# Patient Record
Sex: MALE | Race: WHITE | Employment: FULL TIME | URBAN - METROPOLITAN AREA
[De-identification: names, ages, dates, MRNs, and addresses within clinical notes are randomized per-mention and may not be internally consistent; named-entity substitution may affect disease eponyms.]

---

## 2018-08-15 ENCOUNTER — OFFICE VISIT (OUTPATIENT)
Dept: URGENT CARE | Age: 33
End: 2018-08-15

## 2018-08-15 VITALS
WEIGHT: 200 LBS | SYSTOLIC BLOOD PRESSURE: 118 MMHG | BODY MASS INDEX: 28 KG/M2 | DIASTOLIC BLOOD PRESSURE: 80 MMHG | HEART RATE: 108 BPM | HEIGHT: 71 IN | TEMPERATURE: 100.9 F | OXYGEN SATURATION: 97 % | RESPIRATION RATE: 16 BRPM

## 2018-08-15 DIAGNOSIS — R07.89 CHEST TIGHTNESS: Primary | ICD-10-CM

## 2018-08-15 DIAGNOSIS — R19.7 VOMITING AND DIARRHEA: ICD-10-CM

## 2018-08-15 DIAGNOSIS — R50.9 FEVER, UNSPECIFIED FEVER CAUSE: ICD-10-CM

## 2018-08-15 DIAGNOSIS — R11.10 VOMITING AND DIARRHEA: ICD-10-CM

## 2018-08-15 LAB
BILIRUBIN, POC: ABNORMAL
BLOOD URINE, POC: ABNORMAL
CLARITY, POC: CLEAR
COLOR, POC: ABNORMAL
GLUCOSE URINE, POC: ABNORMAL
KETONES, POC: ABNORMAL
LEUKOCYTE EST, POC: ABNORMAL
NITRITE, POC: ABNORMAL
PH, POC: 6
PROTEIN, POC: ABNORMAL
SPECIFIC GRAVITY, POC: 1.02
UROBILINOGEN, POC: ABNORMAL

## 2018-08-15 PROCEDURE — 99203 OFFICE O/P NEW LOW 30 MIN: CPT | Performed by: EMERGENCY MEDICINE

## 2018-08-15 PROCEDURE — G8427 DOCREV CUR MEDS BY ELIG CLIN: HCPCS | Performed by: EMERGENCY MEDICINE

## 2018-08-15 PROCEDURE — G8419 CALC BMI OUT NRM PARAM NOF/U: HCPCS | Performed by: EMERGENCY MEDICINE

## 2018-08-15 PROCEDURE — 81002 URINALYSIS NONAUTO W/O SCOPE: CPT | Performed by: EMERGENCY MEDICINE

## 2018-08-15 PROCEDURE — 93000 ELECTROCARDIOGRAM COMPLETE: CPT | Performed by: EMERGENCY MEDICINE

## 2018-08-15 PROCEDURE — 1036F TOBACCO NON-USER: CPT | Performed by: EMERGENCY MEDICINE

## 2018-08-15 RX ORDER — ASPIRIN 81 MG/1
162 TABLET, CHEWABLE ORAL ONCE
Qty: 2 TABLET | Refills: 0
Start: 2018-08-15 | End: 2020-09-15

## 2018-08-15 RX ORDER — ACETAMINOPHEN 500 MG
1000 TABLET ORAL ONCE
Qty: 2 TABLET | Refills: 0
Start: 2018-08-15 | End: 2020-09-15 | Stop reason: ALTCHOICE

## 2018-08-15 ASSESSMENT — ENCOUNTER SYMPTOMS
EYE PAIN: 0
NAUSEA: 1
VOMITING: 1
ABDOMINAL PAIN: 0
COUGH: 1
TROUBLE SWALLOWING: 0
SHORTNESS OF BREATH: 0
SORE THROAT: 0
RHINORRHEA: 0
DIARRHEA: 1
WHEEZING: 0
EYE DISCHARGE: 0

## 2018-08-15 NOTE — PROGRESS NOTES
has a normal mood and affect. His behavior is normal.   Vitals reviewed. Procedure:     EKG: NSR, rate 93, nl axis, non-specific lateral ST segment changes and precordial j-point elevation, suspect repolarization vairant. Chest X-Ray    Interpreted by: me    View: PA/Lateral chest xray    Findings: Normal heart size, Normal lungs, Normal mediastinum, No infiltrates, No pneumothorax, No effusion    Results for POC orders placed in visit on 08/15/18   POCT Urinalysis no Micro   Result Value Ref Range    Color, UA reddish     Clarity, UA clear     Glucose, UA POC neg     Bilirubin, UA neg     Ketones, UA neg     Spec Grav, UA 1.025     Blood, UA POC Trace     pH, UA 6.0     Protein, UA POC +     Urobilinogen, UA neg     Leukocytes, UA neg     Nitrite, UA neg        Tylenol 1000mg and Aspirin 162mg given po      Assessment:    1. Chest tightness    2. Fever, unspecified fever cause    3. Vomiting and diarrhea        Patient was advised that his medical problem was beyond the scope of what I could address in an urgent care setting. Risks and benefits of seeking further treatment were discussed with the patient. Patient verbalized his understanding of the risks and benefits to myself. Patient was advised to go directly to the emergency department for further evaluation. EMS tranportation to the emergency department was declined by the patient. Patient appears stable to travel via private vehicle and has selected [facility]. ED staff notified by phone.          Plan:    Orders Placed This Encounter   Medications    acetaminophen (APAP EXTRA STRENGTH) 500 MG tablet     Sig: Take 2 tablets by mouth once for 1 dose Administer during visit     Dispense:  2 tablet     Refill:  0    aspirin (ASPIRIN CHILDRENS) 81 MG chewable tablet     Sig: Take 2 tablets by mouth once for 1 dose 2 by mouth now (during visit)     Dispense:  2 tablet     Refill:  0         Patient Instructions   Go to the Emergency Department at [facility] for further evaluation and treatment.

## 2020-09-15 ENCOUNTER — OFFICE VISIT (OUTPATIENT)
Dept: FAMILY MEDICINE CLINIC | Age: 35
End: 2020-09-15
Payer: COMMERCIAL

## 2020-09-15 VITALS
SYSTOLIC BLOOD PRESSURE: 136 MMHG | DIASTOLIC BLOOD PRESSURE: 90 MMHG | HEART RATE: 90 BPM | OXYGEN SATURATION: 97 % | BODY MASS INDEX: 31.07 KG/M2 | HEIGHT: 70 IN | TEMPERATURE: 97.4 F | RESPIRATION RATE: 16 BRPM | WEIGHT: 217 LBS

## 2020-09-15 DIAGNOSIS — F10.10 ALCOHOL ABUSE: ICD-10-CM

## 2020-09-15 DIAGNOSIS — R03.0 ELEVATED BP WITHOUT DIAGNOSIS OF HYPERTENSION: ICD-10-CM

## 2020-09-15 LAB
A/G RATIO: 1.9 (ref 1.1–2.2)
ALBUMIN SERPL-MCNC: 4.7 G/DL (ref 3.4–5)
ALP BLD-CCNC: 61 U/L (ref 40–129)
ALT SERPL-CCNC: 78 U/L (ref 10–40)
AMYLASE: 42 U/L (ref 25–115)
ANION GAP SERPL CALCULATED.3IONS-SCNC: 14 MMOL/L (ref 3–16)
AST SERPL-CCNC: 66 U/L (ref 15–37)
BASOPHILS ABSOLUTE: 0.1 K/UL (ref 0–0.2)
BASOPHILS RELATIVE PERCENT: 0.7 %
BILIRUB SERPL-MCNC: 0.4 MG/DL (ref 0–1)
BILIRUBIN DIRECT: <0.2 MG/DL (ref 0–0.3)
BILIRUBIN, INDIRECT: ABNORMAL MG/DL (ref 0–1)
BUN BLDV-MCNC: 12 MG/DL (ref 7–20)
CALCIUM SERPL-MCNC: 10.1 MG/DL (ref 8.3–10.6)
CHLORIDE BLD-SCNC: 101 MMOL/L (ref 99–110)
CO2: 24 MMOL/L (ref 21–32)
CREAT SERPL-MCNC: 0.8 MG/DL (ref 0.9–1.3)
EOSINOPHILS ABSOLUTE: 0.2 K/UL (ref 0–0.6)
EOSINOPHILS RELATIVE PERCENT: 2.4 %
GFR AFRICAN AMERICAN: >60
GFR NON-AFRICAN AMERICAN: >60
GLOBULIN: 2.5 G/DL
GLUCOSE BLD-MCNC: 98 MG/DL (ref 70–99)
HCT VFR BLD CALC: 46.5 % (ref 40.5–52.5)
HEMOGLOBIN: 15.8 G/DL (ref 13.5–17.5)
LIPASE: 35 U/L (ref 13–60)
LYMPHOCYTES ABSOLUTE: 1.9 K/UL (ref 1–5.1)
LYMPHOCYTES RELATIVE PERCENT: 24.5 %
MCH RBC QN AUTO: 35.1 PG (ref 26–34)
MCHC RBC AUTO-ENTMCNC: 34 G/DL (ref 31–36)
MCV RBC AUTO: 103.2 FL (ref 80–100)
MONOCYTES ABSOLUTE: 1 K/UL (ref 0–1.3)
MONOCYTES RELATIVE PERCENT: 13.7 %
NEUTROPHILS ABSOLUTE: 4.5 K/UL (ref 1.7–7.7)
NEUTROPHILS RELATIVE PERCENT: 58.7 %
PDW BLD-RTO: 12.6 % (ref 12.4–15.4)
PLATELET # BLD: 283 K/UL (ref 135–450)
PMV BLD AUTO: 8.7 FL (ref 5–10.5)
POTASSIUM SERPL-SCNC: 4.5 MMOL/L (ref 3.5–5.1)
RBC # BLD: 4.51 M/UL (ref 4.2–5.9)
SODIUM BLD-SCNC: 139 MMOL/L (ref 136–145)
TOTAL PROTEIN: 7.2 G/DL (ref 6.4–8.2)
TSH REFLEX: 1.53 UIU/ML (ref 0.27–4.2)
WBC # BLD: 7.6 K/UL (ref 4–11)

## 2020-09-15 PROCEDURE — 1036F TOBACCO NON-USER: CPT | Performed by: FAMILY MEDICINE

## 2020-09-15 PROCEDURE — G8417 CALC BMI ABV UP PARAM F/U: HCPCS | Performed by: FAMILY MEDICINE

## 2020-09-15 PROCEDURE — G8427 DOCREV CUR MEDS BY ELIG CLIN: HCPCS | Performed by: FAMILY MEDICINE

## 2020-09-15 PROCEDURE — 99203 OFFICE O/P NEW LOW 30 MIN: CPT | Performed by: FAMILY MEDICINE

## 2020-09-15 SDOH — HEALTH STABILITY: MENTAL HEALTH: HOW MANY STANDARD DRINKS CONTAINING ALCOHOL DO YOU HAVE ON A TYPICAL DAY?: 5 OR 6

## 2020-09-15 ASSESSMENT — ENCOUNTER SYMPTOMS
ABDOMINAL PAIN: 0
NAUSEA: 0
COUGH: 0
VOMITING: 0
BLOOD IN STOOL: 0
SHORTNESS OF BREATH: 0
SORE THROAT: 0

## 2020-09-15 ASSESSMENT — PATIENT HEALTH QUESTIONNAIRE - PHQ9
SUM OF ALL RESPONSES TO PHQ QUESTIONS 1-9: 0
SUM OF ALL RESPONSES TO PHQ9 QUESTIONS 1 & 2: 0
2. FEELING DOWN, DEPRESSED OR HOPELESS: 0
1. LITTLE INTEREST OR PLEASURE IN DOING THINGS: 0
SUM OF ALL RESPONSES TO PHQ QUESTIONS 1-9: 0

## 2020-09-15 NOTE — PROGRESS NOTES
Chief Complaint   Patient presents with    New Patient     Physical-insurance           Jose Cruz is a 37 y.o. male who presents to be established. Pt reports alcohol abuse over the past 10 years in which he was drinking 6 alcoholic beverages daily. Pt did try AA meetings w/o success in the past and also has tried to wean off alcohol multiple times. Pt most recently has weaned off of alcohol by drinking a beer only as needed for anxiety/tremor and has been sober for 2 weeks. Pt did see a counselor recently which he states was not too helpful. Pt denies any hx of delirium tremons     Pt has started exercising with weights and aerobic activity 4 - 5 times a week over the past month    Pt denies any hx of HTN, asthma, or diabetes; Surgical hx includes R knee ACL repair    Pt is a nonsmoker    Pt is an ; Positive DUI and disorderly conduct in his early 19's,  x 3 years and is expecting his first child in 11/2020 and pt denies any domestic violence issues    FHx negative for alcoholism or diabetes; FHx positive for early CAD (MGF and PGF)      Review of Systems   Constitutional: Negative for fatigue and fever. HENT: Negative for nosebleeds and sore throat. Eyes:        Negative blurred vision or diplopia   Respiratory: Negative for cough and shortness of breath. Cardiovascular: Negative for chest pain, palpitations and leg swelling. Gastrointestinal: Negative for abdominal pain, blood in stool, nausea and vomiting. Negative melena; Positive indigestion once a week   Endocrine: Positive for polyuria (but pt drinks alot of water). Negative for polydipsia. Genitourinary: Negative for dysuria and hematuria. Musculoskeletal: Negative for arthralgias. Skin: Negative for rash. Neurological: Negative for dizziness, tremors, seizures, syncope, speech difficulty, weakness and headaches. Psychiatric/Behavioral: Negative for dysphoric mood.  The patient is nervous/anxious (at sober and will check basic bloodwork. Pt denies any CP or SOB and is with a nl heart exam  - CBC Auto Differential; Future  - Comprehensive Metabolic Panel; Future  - TSH with Reflex; Future        Quoc Mcclure MD      Return in about 3 weeks (around 10/6/2020). Patient Instructions     Patient Education      Go to the ER ASAP if you develop any chest pain, shortness of breath, nausea/vomiting or visual hallucinations! Learning About Alcohol Use Disorder  What is alcohol use disorder? Alcohol use disorder means that a person drinks alcohol even though it causes harm to themselves or others. It can range from mild to severe. The more signs of this disorder you have, the more severe it may be. Moderate to severe alcohol use disorder is sometimes called addiction. People who have it may find it hard to control their use of alcohol. People who have this disorder may argue with others about how much they're drinking. Their job may be affected because of drinking. They may drink when it's dangerous or illegal, such as when they drive. They also may have a strong need, or craving, to drink. They may feel like they must drink just to get by. Their drinking may increase their risk of getting hurt or being in a car crash. Over time, drinking too much alcohol may cause health problems. These may include high blood pressure, liver problems, or problems with digestion. What are the signs? Maybe you've wondered about your alcohol habits, or how to tell if your drinking is becoming a problem. Here are some of the signs of alcohol use disorder. You may have it if you have two or more of the following signs:  · You drink larger amounts of alcohol than you ever meant to. Or you've been drinking for a longer time than you ever meant to. · You can't cut down or control your use. Or you constantly wish you could cut down. · You spend a lot of time getting or drinking alcohol or recovering from its effects.   · You have strong cravings for alcohol. · You can no longer do your main jobs at work, at school, or at home. · You keep drinking alcohol, even though your use hurts your relationships. · You have stopped doing important activities because of your alcohol use. · You drink alcohol in situations where doing so is dangerous. · You keep drinking alcohol even though you know it's causing health problems. · You need more and more alcohol to get the same effect, or you get less effect from the same amount over time. This is called tolerance. · You have uncomfortable symptoms when you stop drinking alcohol or use less. This is called withdrawal.  Alcohol use disorder can range from mild to severe. The more signs you have, the more severe the disorder may be. Moderate to severe alcohol use disorder is sometimes called addiction. You might not realize that your drinking is a problem. You might not drink large amounts when you drink. Or you might go for days or weeks between drinking episodes. But even if you don't drink very often, your drinking could still be harmful and put you at risk. How is alcohol use disorder treated? Getting help is up to you. But you don't have to do it alone. There are many people and kinds of treatments that can help. Treatment for alcohol use disorder can include:  · Group therapy, one or more types of counseling, and alcohol education. · Medicines that help to:  ? Reduce withdrawal symptoms and help you safely stop drinking. ? Reduce cravings for alcohol. · Support groups. These groups include Alcoholics Anonymous and [facility] (Self-Management and Recovery Training). Some people are able to stop or cut back on drinking with help from a counselor. People who have moderate to severe alcohol use disorder may need medical treatment. They may need to stay in a hospital or treatment center. You may have a treatment team to help you.  This team may include a psychologist or psychiatrist, counselors, doctors, social workers, nurses, and a . A  helps plan and manage your treatment. Follow-up care is a key part of your treatment and safety. Be sure to make and go to all appointments, and call your doctor if you are having problems. It's also a good idea to know your test results and keep a list of the medicines you take. Where can you learn more? Go to https://chpepiceweb.[employer]. org and sign in to your [employer] account. Enter [id number] in the [employer] box to learn more about \"Learning About Alcohol Use Disorder. \"     If you do not have an account, please click on the \"Sign Up Now\" link. Current as of: August 22, 2019               Content Version: 12.5  © 8893-8300 [employer]wise, Incorporated. Care instructions adapted under license by [facility] ([facility]). If you have questions about a medical condition or this instruction, always ask your healthcare professional. [address] any warranty or liability for your use of this information.

## 2020-09-15 NOTE — PATIENT INSTRUCTIONS
Patient Education      Go to the ER ASAP if you develop any chest pain, shortness of breath, nausea/vomiting or visual hallucinations! Learning About Alcohol Use Disorder  What is alcohol use disorder? Alcohol use disorder means that a person drinks alcohol even though it causes harm to themselves or others. It can range from mild to severe. The more signs of this disorder you have, the more severe it may be. Moderate to severe alcohol use disorder is sometimes called addiction. People who have it may find it hard to control their use of alcohol. People who have this disorder may argue with others about how much they're drinking. Their job may be affected because of drinking. They may drink when it's dangerous or illegal, such as when they drive. They also may have a strong need, or craving, to drink. They may feel like they must drink just to get by. Their drinking may increase their risk of getting hurt or being in a car crash. Over time, drinking too much alcohol may cause health problems. These may include high blood pressure, liver problems, or problems with digestion. What are the signs? Maybe you've wondered about your alcohol habits, or how to tell if your drinking is becoming a problem. Here are some of the signs of alcohol use disorder. You may have it if you have two or more of the following signs:  · You drink larger amounts of alcohol than you ever meant to. Or you've been drinking for a longer time than you ever meant to. · You can't cut down or control your use. Or you constantly wish you could cut down. · You spend a lot of time getting or drinking alcohol or recovering from its effects. · You have strong cravings for alcohol. · You can no longer do your main jobs at work, at school, or at home. · You keep drinking alcohol, even though your use hurts your relationships. · You have stopped doing important activities because of your alcohol use.   · You drink alcohol in situations

## 2020-10-08 ENCOUNTER — OFFICE VISIT (OUTPATIENT)
Dept: FAMILY MEDICINE CLINIC | Age: 35
End: 2020-10-08
Payer: COMMERCIAL

## 2020-10-08 VITALS
BODY MASS INDEX: 30.64 KG/M2 | HEART RATE: 72 BPM | DIASTOLIC BLOOD PRESSURE: 88 MMHG | WEIGHT: 214 LBS | SYSTOLIC BLOOD PRESSURE: 130 MMHG | OXYGEN SATURATION: 96 % | TEMPERATURE: 98 F | HEIGHT: 70 IN

## 2020-10-08 PROBLEM — F10.21 ALCOHOLISM IN RECOVERY (HCC): Status: ACTIVE | Noted: 2020-10-08

## 2020-10-08 PROCEDURE — 1036F TOBACCO NON-USER: CPT | Performed by: FAMILY MEDICINE

## 2020-10-08 PROCEDURE — 90715 TDAP VACCINE 7 YRS/> IM: CPT | Performed by: FAMILY MEDICINE

## 2020-10-08 PROCEDURE — G8482 FLU IMMUNIZE ORDER/ADMIN: HCPCS | Performed by: FAMILY MEDICINE

## 2020-10-08 PROCEDURE — 90471 IMMUNIZATION ADMIN: CPT | Performed by: FAMILY MEDICINE

## 2020-10-08 PROCEDURE — 99214 OFFICE O/P EST MOD 30 MIN: CPT | Performed by: FAMILY MEDICINE

## 2020-10-08 PROCEDURE — G8427 DOCREV CUR MEDS BY ELIG CLIN: HCPCS | Performed by: FAMILY MEDICINE

## 2020-10-08 PROCEDURE — G8417 CALC BMI ABV UP PARAM F/U: HCPCS | Performed by: FAMILY MEDICINE

## 2020-10-08 RX ORDER — NAPROXEN 500 MG/1
500 TABLET ORAL 2 TIMES DAILY WITH MEALS
Qty: 6 TABLET | Refills: 0 | Status: SHIPPED | OUTPATIENT
Start: 2020-10-08 | End: 2021-08-19 | Stop reason: ALTCHOICE

## 2020-10-08 ASSESSMENT — ENCOUNTER SYMPTOMS
NAUSEA: 0
ABDOMINAL PAIN: 0
COUGH: 0
SHORTNESS OF BREATH: 0
VOMITING: 0
BLOOD IN STOOL: 0
SORE THROAT: 0

## 2020-10-08 NOTE — PROGRESS NOTES
Chief Complaint   Patient presents with    1 Month Follow-Up           Jesu Hood is a 37 y.o. male who presents for followup office visit regarding alcohol abuse an elevated BP. Pt has been sober from alcohol for 6 weeks. Pt did not see psychologist as directed but feels that he does not need one at this time    Patient had recent bloodwork [CMP, CBC, TSH, lipase,amylase, hepatic function panel] done which was within normal limits except for mildly elevated liver enzymes (ALT 78  AST 66) along with increased RBC size with an elevated .2 which are most likely secondary to his alcohol abuse and patient needs to continue to stay sober! Pt is a nonsmoker    Pt is an ; Positive DUI and disorderly conduct in his early 19's,  x 3 years and is expecting his first child in 11/2020 and pt denies any domestic violence issues       Review of Systems   Constitutional: Negative for fatigue and fever. HENT: Negative for nosebleeds and sore throat. Eyes:        Negative blurred vision or diplopia   Respiratory: Negative for cough and shortness of breath. Cardiovascular: Negative for chest pain, palpitations and leg swelling. Gastrointestinal: Negative for abdominal pain, blood in stool, nausea and vomiting. Negative melena or indigestion   Endocrine: Positive for polyuria (but pt drinks alot of water). Negative for polydipsia. Genitourinary: Negative for dysuria and hematuria. Musculoskeletal: Negative for arthralgias. Positive L outer foot pain x 3 days after a brisk treadmill run and pt has been icing the area and using OTC ibuprofen with improvement   Skin: Negative for rash. Neurological: Negative for dizziness, tremors, seizures, syncope, speech difficulty, weakness and headaches. Psychiatric/Behavioral: Negative for dysphoric mood. The patient is not nervous/anxious.           Vitals:    10/08/20 0914   BP: 130/88   Pulse: 72   Temp: 98 °F (36.7 °C)   SpO2: 96%         Physical Exam  Vitals signs reviewed. Constitutional:       General: He is not in acute distress. HENT:      Mouth/Throat:      Mouth: Mucous membranes are moist.      Pharynx: Oropharynx is clear. Eyes:      General: No scleral icterus. Comments: Pink conjunctivae    Neck:      Thyroid: No thyromegaly. Cardiovascular:      Heart sounds: Normal heart sounds. No murmur. No friction rub. No gallop. Pulmonary:      Effort: Pulmonary effort is normal.      Breath sounds: Normal breath sounds. Abdominal:      Palpations: Abdomen is soft. Tenderness: There is no abdominal tenderness. Musculoskeletal:      Comments: No leg edema noted B/L;  L foot/ankle : no soft tissue/bony tendernous to palpation noted   Lymphadenopathy:      Cervical: No cervical adenopathy. Skin:     Findings: No rash. Neurological:      Mental Status: He is alert. Psychiatric:         Mood and Affect: Mood normal.         ASSESSMENT AND PLAN    1. Need for Tdap vaccination  - Tdap (age 6y and older) IM ([address])    2. Alcohol abuse  Pt has remained sober for 6 weeks and denies any tremors or anxiety    3. Elevated BP without diagnosis of hypertension  Pt's BP has normalized off of alcohol    4. Foot pain, left  Most likely is secondary to a tendon strain and will treat pt with a short course of NSAIDS  - naproxen (NAPROSYN) 500 MG tablet; Take 1 tablet by mouth 2 times daily (with meals) for 3 days  Dispense: 6 tablet; Refill: 0    5. Elevated liver enzymes  Pt is with a nontender abdomen on PE and most likely is secondary to his alcohol abuse and will recheck LFT bloodwork at his next Igor Adame MD      Return in about 3 months (around 1/8/2021). There are no Patient Instructions on file for this visit.

## 2020-10-09 ENCOUNTER — TELEPHONE (OUTPATIENT)
Dept: FAMILY MEDICINE CLINIC | Age: 35
End: 2020-10-09

## 2020-10-09 NOTE — TELEPHONE ENCOUNTER
Please contact Corine Steven at [facility] to advise regarding the referral on this patient. They need additional referral information for the neuroscience psychology referral. They are wondering if this should go to psychology or neuropsychology (eval)? Call back number for Corine Steven is [phone].

## 2020-10-13 NOTE — TELEPHONE ENCOUNTER
Elissa Ramírez at [facility] advised.     Chart notes from 9/15 and 10/8, copy of insurance card and face sheet faxed to Fax [phone] to Elissa Ramírez at [address] [facility] for referral of this patient per Haleigh's request.

## 2021-07-02 ENCOUNTER — TELEPHONE (OUTPATIENT)
Dept: PRIMARY CARE CLINIC | Age: 36
End: 2021-07-02

## 2021-07-02 NOTE — TELEPHONE ENCOUNTER
Per patient he was told that he was to get medication to help him stop drinking, I informed pt that PCP was out of office. Patient was last seen in office 10/09/2020, he was suppose to follow-up with Cira Hill from [address] [place] [address] he never did. Patient would like a return call.   [phone]

## 2021-07-03 ENCOUNTER — TELEPHONE (OUTPATIENT)
Dept: PRIMARY CARE CLINIC | Age: 36
End: 2021-07-03

## 2021-07-03 DIAGNOSIS — F10.20 ALCOHOLISM (HCC): Primary | ICD-10-CM

## 2021-07-03 RX ORDER — DISULFIRAM 500 MG/1
1 TABLET ORAL DAILY
Qty: 7 TABLET | Refills: 0 | Status: SHIPPED
Start: 2021-07-03 | End: 2021-07-08 | Stop reason: DRUGHIGH

## 2021-07-03 NOTE — TELEPHONE ENCOUNTER
Patient called requesting a Rx for Antabuse. Patient states he is alcoholic. Patient states he relapsed last week. Patient states Dr. Alexander Medina has offered antabuse in the past and patient had declined because he wanted to try naturally to stop alcohol. Patient states he hasn't had any alcohol for 3 days. Rx for Antabuse 500mg once daily for 1 week sent to patient's pharmacy. Patient can follow up with Dr. Alexander Medina after the holiday for further treatment.

## 2021-07-05 ENCOUNTER — TELEPHONE (OUTPATIENT)
Dept: PRIMARY CARE CLINIC | Age: 36
End: 2021-07-05

## 2021-07-05 NOTE — TELEPHONE ENCOUNTER
----- Message from Mik Orlando sent at 7/2/2021  5:23 PM EDT -----  Subject: Message to Provider    QUESTIONS  Information for Provider? Pt called late in the day stating he is ready   for the RX of Antabuse that he and Dr. Vicente Solano discussed. Would like that   asap sent to [facility]. Pt is   requesting this Urgently.   ---------------------------------------------------------------------------  --------------  CALL BACK INFO  What is the best way for the office to contact you? OK to leave message on   voicemail  Preferred Call Back Phone Number? [phone]  ---------------------------------------------------------------------------  --------------  SCRIPT ANSWERS  Relationship to Patient?  Self

## 2021-07-08 ENCOUNTER — OFFICE VISIT (OUTPATIENT)
Dept: PRIMARY CARE CLINIC | Age: 36
End: 2021-07-08
Payer: COMMERCIAL

## 2021-07-08 VITALS
OXYGEN SATURATION: 97 % | WEIGHT: 206 LBS | HEART RATE: 85 BPM | DIASTOLIC BLOOD PRESSURE: 82 MMHG | BODY MASS INDEX: 29.56 KG/M2 | SYSTOLIC BLOOD PRESSURE: 130 MMHG

## 2021-07-08 DIAGNOSIS — F10.20 ALCOHOLISM (HCC): Primary | ICD-10-CM

## 2021-07-08 PROCEDURE — 99214 OFFICE O/P EST MOD 30 MIN: CPT | Performed by: FAMILY MEDICINE

## 2021-07-08 PROCEDURE — G8417 CALC BMI ABV UP PARAM F/U: HCPCS | Performed by: FAMILY MEDICINE

## 2021-07-08 PROCEDURE — G8427 DOCREV CUR MEDS BY ELIG CLIN: HCPCS | Performed by: FAMILY MEDICINE

## 2021-07-08 PROCEDURE — 1036F TOBACCO NON-USER: CPT | Performed by: FAMILY MEDICINE

## 2021-07-08 RX ORDER — DISULFIRAM 250 MG/1
250 TABLET ORAL DAILY
Qty: 30 TABLET | Refills: 1 | Status: SHIPPED | OUTPATIENT
Start: 2021-07-08 | End: 2021-07-08 | Stop reason: SDUPTHER

## 2021-07-08 RX ORDER — DISULFIRAM 250 MG/1
250 TABLET ORAL DAILY
Qty: 30 TABLET | Refills: 1 | Status: SHIPPED | OUTPATIENT
Start: 2021-07-08 | End: 2021-08-19 | Stop reason: SDUPTHER

## 2021-07-08 SDOH — ECONOMIC STABILITY: FOOD INSECURITY: WITHIN THE PAST 12 MONTHS, THE FOOD YOU BOUGHT JUST DIDN'T LAST AND YOU DIDN'T HAVE MONEY TO GET MORE.: NEVER TRUE

## 2021-07-08 SDOH — ECONOMIC STABILITY: FOOD INSECURITY: WITHIN THE PAST 12 MONTHS, YOU WORRIED THAT YOUR FOOD WOULD RUN OUT BEFORE YOU GOT MONEY TO BUY MORE.: NEVER TRUE

## 2021-07-08 ASSESSMENT — ENCOUNTER SYMPTOMS
COUGH: 0
NAUSEA: 0
SORE THROAT: 0
BLOOD IN STOOL: 0
ABDOMINAL PAIN: 0
VOMITING: 0
SHORTNESS OF BREATH: 0

## 2021-07-08 ASSESSMENT — PATIENT HEALTH QUESTIONNAIRE - PHQ9
SUM OF ALL RESPONSES TO PHQ QUESTIONS 1-9: 2
1. LITTLE INTEREST OR PLEASURE IN DOING THINGS: 1
2. FEELING DOWN, DEPRESSED OR HOPELESS: 1
SUM OF ALL RESPONSES TO PHQ QUESTIONS 1-9: 2
SUM OF ALL RESPONSES TO PHQ9 QUESTIONS 1 & 2: 2
SUM OF ALL RESPONSES TO PHQ QUESTIONS 1-9: 2

## 2021-07-08 ASSESSMENT — SOCIAL DETERMINANTS OF HEALTH (SDOH): HOW HARD IS IT FOR YOU TO PAY FOR THE VERY BASICS LIKE FOOD, HOUSING, MEDICAL CARE, AND HEATING?: NOT HARD AT ALL

## 2021-07-08 NOTE — PATIENT INSTRUCTIONS
Patient Education      Go to the ER ASAP if you develop any nausea/vomiting, throbbing headache, chest pain or shortness of breath!      disulfiram  Pronunciation:  dye SUL fi jason  Brand:  Antabuse  What is the most important information I should know about disulfiram?  You should not use disulfiram if you have recently taken metronidazole or paraldehyde, or if you have consumed any foods or products that contain alcohol (mouthwash, cough medicine, cooking wine or vinegar, certain desserts, and others). Do not take disulfiram if you have consumed alcohol within the past 12 hours. Do not drink alcohol while taking disulfiram, and for up to 14 days after you stop taking this medicine. Disulfiram should never be given to a person without his or her knowledge of taking the medicine. What is disulfiram?  Disulfiram blocks an enzyme that is involved in metabolizing alcohol intake. Disulfiram produces very unpleasant side effects when combined with alcohol in the body. Disulfiram is used in certain people with chronic alcoholism. This medicine can help keep you from drinking because of the unpleasant side effects that will occur if you consume alcohol while taking disulfiram.  Disulfiram is used together with behavior modification, psychotherapy, and counseling support to help you stop drinking. This medicine is not a cure  for alcoholism. Disulfiram may also be used for purposes not listed in this medication guide. What should I discuss with my healthcare provider before taking disulfiram?  Do not take disulfiram if you have consumed alcohol within the past 12 hours. Do not drink alcohol while taking disulfiram and for up to 14 days after you stop taking this medicine.   You should not use disulfiram if you are allergic to it, or if:  · you have recently taken metronidazole (Flagyl) or paraldehyde; or  · you have consumed any foods or products that contain alcohol (mouthwash, cough medicine, cooking wine or vinegar, certain desserts, and others). To make sure disulfiram is safe for you, tell your doctor if you have:  · liver or kidney disease;  · heart disease, high blood pressure, history of heart attack or stroke;  · underactive thyroid;  · diabetes;  · seizures or epilepsy;  · head injury or brain damage;  · a history of mental illness or psychosis;  · an allergy to rubber; or  · if you take phenytoin (Dilantin), tuberculosis medicine, or a blood thinner (warfarin, Coumadin, Aline Mo). FDA pregnancy category C. It is not known whether disulfiram will harm an unborn baby. Tell your doctor if you are pregnant or plan to become pregnant while using this medicine. It is not known whether disulfiram passes into breast milk or if it could harm a nursing baby. You should not breast-feed while using this medicine. Do not give this medicine to anyone under 25years old without medical advice. How should I take disulfiram?  Follow all directions on your prescription label. Your doctor may occasionally change your dose to make sure you get the best results. Do not take this medicine in larger or smaller amounts or for longer than recommended. You will need frequent blood tests to check your liver function. Wear a medical alert tag or carry an ID card stating that you take disulfiram. Any medical care provider who treats you should know that you are using disulfiram.  When disulfiram is used as part of a treatment program for alcohol addiction or detoxification, your doctor may recommend that this medicine be given to you by a family member or other caregiver. This is to make sure you are using the medicine as it was prescribed as part of your treatment. Additional forms of counseling and/or monitoring may be recommended during treatment with disulfiram.  For best results, keep using this medicine as directed. Disulfiram is sometimes given for up to several months or years.   Store at room temperature away from moisture, alcohol are used together, such as severe chest pain spreading to your jaw or shoulder, slow heart rate, weak pulse, seizure, fainting, weak or shallow breathing, or slow breathing (breathing may stop). A disulfiram-alcohol reaction can be fatal.  Call your doctor at once if you have:  · eye pain or sudden vision loss;  · confusion, unusual thoughts or behavior; or  · liver problems --nausea, upper stomach pain, itching, tired feeling, loss of appetite, dark urine, kelechi-colored stools, jaundice (yellowing of the skin or eyes). Common side effects may include:  · skin rash, acne;  · mild headache, tired feeling;  · impotence, loss of interest in sex; or  · metallic or garlic-like taste in the mouth. This is not a complete list of side effects and others may occur. Call your doctor for medical advice about side effects. You may report side effects to FDA at 7-796-FDA-9694. What other drugs will affect disulfiram?  Other drugs may interact with disulfiram, including prescription and over-the-counter medicines, vitamins, and herbal products. Tell each of your health care providers about all medicines you use now and any medicine you start or stop using. Where can I get more information? Your pharmacist can provide more information about disulfiram.  Remember, keep this and all other medicines out of the reach of children, never share your medicines with others, and use this medication only for the indication prescribed. Every effort has been made to ensure that the information provided by [address] is accurate, up-to-date, and complete, but no guarantee is made to that effect. Drug information contained herein may be time sensitive. [facility] information has been compiled for use by healthcare practitioners and consumers in the [place] and therefore [employer] does not warrant that uses outside of the [place] are appropriate, unless specifically indicated otherwise.  [facility]'s drug information does not endorse drugs, diagnose patients or recommend therapy. [facility]'s drug information is an informational resource designed to assist licensed healthcare practitioners in caring for their patients and/or to serve consumers viewing this service as a supplement to, and not a substitute for, the expertise, skill, knowledge and judgment of healthcare practitioners. The absence of a warning for a given drug or drug combination in no way should be construed to indicate that the drug or drug combination is safe, effective or appropriate for any given patient. [facility] does not assume any responsibility for any aspect of healthcare administered with the aid of information [facility] provides. The information contained herein is not intended to cover all possible uses, directions, precautions, warnings, drug interactions, allergic reactions, or adverse effects. If you have questions about the drugs you are taking, check with your doctor, nurse or pharmacist.  Copyright 7416-5087 [address]: 2.01. Revision date: 3/13/2014. Care instructions adapted under license by [facility] ([facility]). If you have questions about a medical condition or this instruction, always ask your healthcare professional. [place] [address] any warranty or liability for your use of this information.

## 2021-07-08 NOTE — PROGRESS NOTES
Chief Complaint   Patient presents with    Medication Check         Lynetta Bernheim is a 37 y.o. male who presents as directed secondary to alcohol abuse. Pt relapsed about 6 months ago and was drinking daily and started to go to [facility] and last week was Rx antabuse medication by a physician on call which he started taking 5 days ago and pt has been sober x 1 week. Pt is scheduled to see a rehab specialist next week    Pt did have the birth of a healthy baby girl back in 11/2021    Pt is a nonsmoker     Pt is an ; Positive DUI and disorderly conduct in his early 19's,  x 3 years and pt denies any domestic violence issues       Review of Systems   Constitutional: Negative for fatigue and fever. HENT: Negative for nosebleeds and sore throat. Eyes:        Negative blurred vision or diplopia   Respiratory: Negative for cough and shortness of breath. Cardiovascular: Negative for chest pain, palpitations and leg swelling. Gastrointestinal: Negative for abdominal pain, blood in stool, nausea and vomiting. Negative melena or indigestion   Endocrine: Positive for polyuria (but pt drinks alot of water). Negative for polydipsia. Genitourinary: Negative for dysuria and hematuria. Musculoskeletal: Negative for arthralgias. Skin: Negative for rash. Neurological: Positive for headaches (last week after strenuous run). Negative for dizziness, tremors, seizures, syncope, speech difficulty and weakness. Psychiatric/Behavioral: Positive for dysphoric mood (mild). Negative for hallucinations. The patient is not nervous/anxious. Vitals:    07/08/21 1010   BP: 130/82   Pulse: 85   SpO2: 97%         Physical Exam  Vitals reviewed. Constitutional:       General: He is not in acute distress. HENT:      Mouth/Throat:      Mouth: Mucous membranes are moist.      Pharynx: Oropharynx is clear. Eyes:      General: No scleral icterus.      Comments: Pink conjunctivae    Neck: Thyroid: No thyromegaly. Comments: No carotid bruits noted B/L  Cardiovascular:      Heart sounds: Normal heart sounds. No murmur heard. No friction rub. No gallop. Pulmonary:      Effort: Pulmonary effort is normal.      Breath sounds: Normal breath sounds. Abdominal:      Palpations: Abdomen is soft. Tenderness: There is no abdominal tenderness. Musculoskeletal:      Comments: No leg edema noted B/L   Lymphadenopathy:      Cervical: No cervical adenopathy. Skin:     Findings: Rash (papular on forehead which patient states is secondary to makeup use for an interview) present. Neurological:      Mental Status: He is alert. Comments: Cranial nerves 2 - 12 grossly intact; Muscle strength 5/5 throughout   Psychiatric:         Mood and Affect: Mood normal.         ASSESSMENT AND PLAN    1. Alcoholism ([facility] [address]  Pt is scheduled to see a Rehab specialist next week and will continue Rx antabuse medication but at a lower dosage and patient was warned of the serious side effects of drinking any alcohol on this medication! Pt is with anicteric sclerae and a nontender abdomen on PE and will order basic bloodwork which pt was told to have done in 2 weeks. VSS  - disulfiram (ANTABUSE) 250 MG tablet; Take 1 tablet by mouth daily  Dispense: 30 tablet; Refill: 1  - Comprehensive Metabolic Panel; Future  - CBC Auto Differential; Future  - TSH with Reflex; Future      David Sheikh MD      Return in about 6 weeks (around 8/19/2021).        Patient Instructions     Patient Education      Go to the ER ASAP if you develop any nausea/vomiting, throbbing headache, chest pain or shortness of breath!      disulfiram  Pronunciation:  dye SUL fi jason  Brand:  Antabuse  What is the most important information I should know about disulfiram?  You should not use disulfiram if you have recently taken metronidazole or paraldehyde, or if you have consumed any foods or products that contain alcohol (mouthwash, cough medicine, cooking wine or vinegar, certain desserts, and others). Do not take disulfiram if you have consumed alcohol within the past 12 hours. Do not drink alcohol while taking disulfiram, and for up to 14 days after you stop taking this medicine. Disulfiram should never be given to a person without his or her knowledge of taking the medicine. What is disulfiram?  Disulfiram blocks an enzyme that is involved in metabolizing alcohol intake. Disulfiram produces very unpleasant side effects when combined with alcohol in the body. Disulfiram is used in certain people with chronic alcoholism. This medicine can help keep you from drinking because of the unpleasant side effects that will occur if you consume alcohol while taking disulfiram.  Disulfiram is used together with behavior modification, psychotherapy, and counseling support to help you stop drinking. This medicine is not a cure  for alcoholism. Disulfiram may also be used for purposes not listed in this medication guide. What should I discuss with my healthcare provider before taking disulfiram?  Do not take disulfiram if you have consumed alcohol within the past 12 hours. Do not drink alcohol while taking disulfiram and for up to 14 days after you stop taking this medicine. You should not use disulfiram if you are allergic to it, or if:  · you have recently taken metronidazole (Flagyl) or paraldehyde; or  · you have consumed any foods or products that contain alcohol (mouthwash, cough medicine, cooking wine or vinegar, certain desserts, and others).   To make sure disulfiram is safe for you, tell your doctor if you have:  · liver or kidney disease;  · heart disease, high blood pressure, history of heart attack or stroke;  · underactive thyroid;  · diabetes;  · seizures or epilepsy;  · head injury or brain damage;  · a history of mental illness or psychosis;  · an allergy to rubber; or  · if you take phenytoin (Dilantin), tuberculosis medicine, or a blood thinner (warfarin, Coumadin, Daivd Oz). FDA pregnancy category C. It is not known whether disulfiram will harm an unborn baby. Tell your doctor if you are pregnant or plan to become pregnant while using this medicine. It is not known whether disulfiram passes into breast milk or if it could harm a nursing baby. You should not breast-feed while using this medicine. Do not give this medicine to anyone under 25years old without medical advice. How should I take disulfiram?  Follow all directions on your prescription label. Your doctor may occasionally change your dose to make sure you get the best results. Do not take this medicine in larger or smaller amounts or for longer than recommended. You will need frequent blood tests to check your liver function. Wear a medical alert tag or carry an ID card stating that you take disulfiram. Any medical care provider who treats you should know that you are using disulfiram.  When disulfiram is used as part of a treatment program for alcohol addiction or detoxification, your doctor may recommend that this medicine be given to you by a family member or other caregiver. This is to make sure you are using the medicine as it was prescribed as part of your treatment. Additional forms of counseling and/or monitoring may be recommended during treatment with disulfiram.  For best results, keep using this medicine as directed. Disulfiram is sometimes given for up to several months or years. Store at room temperature away from moisture, heat, and light. What happens if I miss a dose? Take the missed dose as soon as you remember. Take the rest of the day's doses at evenly spaced intervals unless otherwise directed by your doctor. What happens if I overdose? Seek emergency medical attention or call the Poison Help line at 1-692.354.4717. What should I avoid while taking disulfiram?  Do not drink alcohol while taking disulfiram.  Avoid situations that might tempt you to drink.   Be aware that many common products contain small amounts of alcohol, enough to cause a disulfiram reaction. Such products include aftershave, cologne, perfume, antiperspirant, mouthwash, antiseptic astringent skin products, hair dyes, and others. Check the label to see if any food or medicine product contains alcohol. Ask your pharmacist if you have questions. Avoid coming into contact with non-consumable products that may contain alcohol: paint thinners, solvents, stains, lacquers and others. Avoid coming into contact with or breathing the fumes of pesticides or chemicals used in manufacturing or certain other industries (waxes, dyes, resins, and gums). What are the possible side effects of disulfiram?  Get emergency medical help if you have any of these signs of an allergic reaction: hives; difficult breathing; swelling of your face, lips, tongue, or throat. Even small amounts of alcohol can produce unpleasant symptoms while disulfiram is in your body. These symptoms include:  · flushing (warmth, redness, or tingly feeling);  · sweating, increased thirst, swelling, rapid weight gain;  · nausea, severe vomiting;  · neck pain, throbbing headache, blurred vision;  · chest pain, shortness of breath (even with mild exertion);  · fast or pounding heartbeats or fluttering in your chest;  · confusion, weakness, spinning sensation, feeling unsteady; or  · a light-headed feeling, like you might pass out. More severe symptoms may occur when disulfiram and large amounts of alcohol are used together, such as severe chest pain spreading to your jaw or shoulder, slow heart rate, weak pulse, seizure, fainting, weak or shallow breathing, or slow breathing (breathing may stop).  A disulfiram-alcohol reaction can be fatal.  Call your doctor at once if you have:  · eye pain or sudden vision loss;  · confusion, unusual thoughts or behavior; or  · liver problems --nausea, upper stomach pain, itching, tired feeling, loss of appetite, dark urine, kelechi-colored stools, jaundice (yellowing of the skin or eyes). Common side effects may include:  · skin rash, acne;  · mild headache, tired feeling;  · impotence, loss of interest in sex; or  · metallic or garlic-like taste in the mouth. This is not a complete list of side effects and others may occur. Call your doctor for medical advice about side effects. You may report side effects to FDA at 9-262-WHC-9665. What other drugs will affect disulfiram?  Other drugs may interact with disulfiram, including prescription and over-the-counter medicines, vitamins, and herbal products. Tell each of your health care providers about all medicines you use now and any medicine you start or stop using. Where can I get more information? Your pharmacist can provide more information about disulfiram.  Remember, keep this and all other medicines out of the reach of children, never share your medicines with others, and use this medication only for the indication prescribed. Every effort has been made to ensure that the information provided by [address] is accurate, up-to-date, and complete, but no guarantee is made to that effect. Drug information contained herein may be time sensitive. [facility] information has been compiled for use by healthcare practitioners and consumers in the [place] and therefore [facility] does not warrant that uses outside of the [place] are appropriate, unless specifically indicated otherwise. [facility]'s drug information does not endorse drugs, diagnose patients or recommend therapy. [facility] drug information is an informational resource designed to assist licensed healthcare practitioners in caring for their patients and/or to serve consumers viewing this service as a supplement to, and not a substitute for, the expertise, skill, knowledge and judgment of healthcare practitioners.  The absence of a warning for a given drug or drug combination in no way should be construed to indicate that the drug or drug combination is safe, effective or appropriate for any given patient. [facility] does not assume any responsibility for any aspect of healthcare administered with the aid of information [facility] provides. The information contained herein is not intended to cover all possible uses, directions, precautions, warnings, drug interactions, allergic reactions, or adverse effects. If you have questions about the drugs you are taking, check with your doctor, nurse or pharmacist.  Copyright 6650-6279 [address]: 2.01. Revision date: 3/13/2014. Care instructions adapted under license by [facility] ([facility]). If you have questions about a medical condition or this instruction, always ask your healthcare professional. [place] [address] any warranty or liability for your use of this information.

## 2021-08-12 ENCOUNTER — TELEPHONE (OUTPATIENT)
Dept: PRIMARY CARE CLINIC | Age: 36
End: 2021-08-12

## 2021-08-12 NOTE — TELEPHONE ENCOUNTER
----- Message from Rudine Sessions sent at 8/12/2021  9:01 AM EDT -----  Subject: Message to Provider    QUESTIONS  Information for Provider? Patient would like for his Doctor to put in   orders for blood work.   ---------------------------------------------------------------------------  --------------  [address]  What is the best way for the office to contact you? OK to leave message on   voicemail  Preferred Call Back Phone Number? [phone]  ---------------------------------------------------------------------------  --------------  SCRIPT ANSWERS  Relationship to Patient?  Self

## 2021-08-12 NOTE — TELEPHONE ENCOUNTER
Left patient a detailed voicemail stating that the order for labs is already in the system. He just needs to go to the lab to have it drawn.

## 2021-08-14 ENCOUNTER — HOSPITAL ENCOUNTER (OUTPATIENT)
Age: 36
Discharge: HOME | End: 2021-08-14
Payer: COMMERCIAL

## 2021-08-14 DIAGNOSIS — F10.20 ALCOHOLISM (HCC): ICD-10-CM

## 2021-08-14 PROCEDURE — 80053 COMPREHEN METABOLIC PANEL: CPT

## 2021-08-14 PROCEDURE — 84443 ASSAY THYROID STIM HORMONE: CPT

## 2021-08-14 PROCEDURE — 36415 COLL VENOUS BLD VENIPUNCTURE: CPT

## 2021-08-14 PROCEDURE — 85025 COMPLETE CBC W/AUTO DIFF WBC: CPT

## 2021-08-15 LAB
A/G RATIO: 1.5 (ref 1.1–2.2)
ALBUMIN SERPL-MCNC: 4.4 G/DL (ref 3.4–5)
ALP BLD-CCNC: 79 U/L (ref 40–129)
ALT SERPL-CCNC: 27 U/L (ref 10–40)
ANION GAP SERPL CALCULATED.3IONS-SCNC: 11 MMOL/L (ref 3–16)
AST SERPL-CCNC: 20 U/L (ref 15–37)
BASOPHILS ABSOLUTE: 0 K/UL (ref 0–0.2)
BASOPHILS RELATIVE PERCENT: 0.5 %
BILIRUB SERPL-MCNC: 0.3 MG/DL (ref 0–1)
BUN BLDV-MCNC: 12 MG/DL (ref 7–20)
CALCIUM SERPL-MCNC: 9.9 MG/DL (ref 8.3–10.6)
CHLORIDE BLD-SCNC: 101 MMOL/L (ref 99–110)
CO2: 26 MMOL/L (ref 21–32)
CREAT SERPL-MCNC: 1 MG/DL (ref 0.9–1.3)
EOSINOPHILS ABSOLUTE: 0.2 K/UL (ref 0–0.6)
EOSINOPHILS RELATIVE PERCENT: 3.2 %
GFR AFRICAN AMERICAN: >60
GFR NON-AFRICAN AMERICAN: >60
GLOBULIN: 3 G/DL
GLUCOSE BLD-MCNC: 76 MG/DL (ref 70–99)
HCT VFR BLD CALC: 45 % (ref 40.5–52.5)
HEMOGLOBIN: 15.3 G/DL (ref 13.5–17.5)
LYMPHOCYTES ABSOLUTE: 2.2 K/UL (ref 1–5.1)
LYMPHOCYTES RELATIVE PERCENT: 29.6 %
MCH RBC QN AUTO: 33 PG (ref 26–34)
MCHC RBC AUTO-ENTMCNC: 33.9 G/DL (ref 31–36)
MCV RBC AUTO: 97.4 FL (ref 80–100)
MONOCYTES ABSOLUTE: 0.8 K/UL (ref 0–1.3)
MONOCYTES RELATIVE PERCENT: 11 %
NEUTROPHILS ABSOLUTE: 4.2 K/UL (ref 1.7–7.7)
NEUTROPHILS RELATIVE PERCENT: 55.7 %
PDW BLD-RTO: 12 % (ref 12.4–15.4)
PLATELET # BLD: 268 K/UL (ref 135–450)
PMV BLD AUTO: 9.4 FL (ref 5–10.5)
POTASSIUM SERPL-SCNC: 4.5 MMOL/L (ref 3.5–5.1)
RBC # BLD: 4.62 M/UL (ref 4.2–5.9)
SODIUM BLD-SCNC: 138 MMOL/L (ref 136–145)
TOTAL PROTEIN: 7.4 G/DL (ref 6.4–8.2)
TSH REFLEX: 1.8 UIU/ML (ref 0.27–4.2)
WBC # BLD: 7.5 K/UL (ref 4–11)

## 2021-08-19 ENCOUNTER — OFFICE VISIT (OUTPATIENT)
Dept: PRIMARY CARE CLINIC | Age: 36
End: 2021-08-19
Payer: COMMERCIAL

## 2021-08-19 VITALS
BODY MASS INDEX: 28.06 KG/M2 | HEART RATE: 82 BPM | OXYGEN SATURATION: 96 % | WEIGHT: 196 LBS | SYSTOLIC BLOOD PRESSURE: 110 MMHG | DIASTOLIC BLOOD PRESSURE: 82 MMHG | HEIGHT: 70 IN | RESPIRATION RATE: 20 BRPM | TEMPERATURE: 97.8 F

## 2021-08-19 DIAGNOSIS — F10.20 ALCOHOLISM (HCC): ICD-10-CM

## 2021-08-19 PROCEDURE — G8427 DOCREV CUR MEDS BY ELIG CLIN: HCPCS | Performed by: FAMILY MEDICINE

## 2021-08-19 PROCEDURE — 1036F TOBACCO NON-USER: CPT | Performed by: FAMILY MEDICINE

## 2021-08-19 PROCEDURE — 99213 OFFICE O/P EST LOW 20 MIN: CPT | Performed by: FAMILY MEDICINE

## 2021-08-19 PROCEDURE — G8417 CALC BMI ABV UP PARAM F/U: HCPCS | Performed by: FAMILY MEDICINE

## 2021-08-19 RX ORDER — DISULFIRAM 250 MG/1
250 TABLET ORAL DAILY
Qty: 30 TABLET | Refills: 2 | Status: SHIPPED | OUTPATIENT
Start: 2021-08-19 | End: 2021-10-26 | Stop reason: SDUPTHER

## 2021-08-19 ASSESSMENT — ENCOUNTER SYMPTOMS
SORE THROAT: 0
COUGH: 0
BLOOD IN STOOL: 0
VOMITING: 0
SHORTNESS OF BREATH: 0
ABDOMINAL PAIN: 0
NAUSEA: 0

## 2021-08-19 NOTE — PROGRESS NOTES
Chief Complaint   Patient presents with    Alcohol Problem     review blood work         Maia Carcamo is a 37 y.o. male who presents for followup visit regarding alcoholism. Pt has been sober since 07/02/2021 on Rx antabuse medication. Pt reports initial SE headache and decreased libido on antabuse which has resiolved. Pt has been going to [place] about 4 days a week. Pt has lost 10 lbs since last office visit with alcohol cessation    Pt would like a PSA level as multiple family members have cancer though pt was told that screening for this cancer usually begins at 47 y/o    Patient had recent bloodwork [TSH, CBC, CMP] which was within normal limits     Pt did have the birth of a healthy baby girl back in 11/2021     Pt is a nonsmoker     Pt is an ; Positive DUI and disorderly conduct in his early 21's,  x 3 years and pt denies any domestic violence issues       Review of Systems   Constitutional: Negative for fatigue and fever. HENT: Negative for nosebleeds and sore throat. Eyes:        Negative blurred vision or diplopia   Respiratory: Negative for cough and shortness of breath. Cardiovascular: Negative for chest pain, palpitations and leg swelling. Gastrointestinal: Negative for abdominal pain, blood in stool, nausea and vomiting. Negative melena or indigestion   Endocrine: Negative for polydipsia and polyuria. Genitourinary: Negative for dysuria and hematuria. Musculoskeletal: Negative for arthralgias. Skin: Negative for rash. Positive facial flushing with papules but improved with alcohol cessation   Neurological: Negative for dizziness, tremors, seizures, syncope, speech difficulty, weakness and headaches. Psychiatric/Behavioral: Positive for dysphoric mood (mild). Negative for hallucinations. The patient is not nervous/anxious.           Vitals:    08/19/21 0818   BP: 110/82   Pulse: 82   Resp: 20   Temp: 97.8 °F (36.6 °C)   SpO2: 96%         Physical Exam  Vitals reviewed. Constitutional:       General: He is not in acute distress. HENT:      Mouth/Throat:      Mouth: Mucous membranes are moist.      Pharynx: Oropharynx is clear. Eyes:      General: No scleral icterus. Comments: Pink conjunctivae    Neck:      Thyroid: No thyromegaly. Cardiovascular:      Heart sounds: Normal heart sounds. No murmur heard. No friction rub. No gallop. Pulmonary:      Effort: Pulmonary effort is normal.      Breath sounds: Normal breath sounds. Abdominal:      Palpations: Abdomen is soft. Tenderness: There is no abdominal tenderness. Musculoskeletal:      Comments: No leg edema noted B/L   Lymphadenopathy:      Cervical: No cervical adenopathy. Skin:     Findings: No rash. Neurological:      Mental Status: He is alert. Comments: Cranial nerves 2 - 12 grossly intact; Muscle strength 5/5 throughout   Psychiatric:         Mood and Affect: Mood normal.         ASSESSMENT AND PLAN    1. Alcoholism ([address]  Pt clinically stable on antabuse medication along with AA meetings and is with a nontender abdomen on PE and had recent normal CBC and CMP bloodwork. VSS  - disulfiram (ANTABUSE) 250 MG tablet; Take 1 tablet by mouth daily  Dispense: 30 tablet; Refill: 2        Rimma Zhou MD      Return in about 3 months (around 11/19/2021).

## 2021-10-26 ENCOUNTER — OFFICE VISIT (OUTPATIENT)
Dept: PRIMARY CARE CLINIC | Age: 36
End: 2021-10-26
Payer: COMMERCIAL

## 2021-10-26 VITALS
DIASTOLIC BLOOD PRESSURE: 92 MMHG | HEIGHT: 70 IN | BODY MASS INDEX: 27.35 KG/M2 | RESPIRATION RATE: 20 BRPM | SYSTOLIC BLOOD PRESSURE: 140 MMHG | TEMPERATURE: 98.7 F | HEART RATE: 78 BPM | OXYGEN SATURATION: 98 % | WEIGHT: 191 LBS

## 2021-10-26 DIAGNOSIS — F10.20 ALCOHOLISM (HCC): ICD-10-CM

## 2021-10-26 DIAGNOSIS — F29 PSYCHOSIS, UNSPECIFIED PSYCHOSIS TYPE (HCC): Primary | ICD-10-CM

## 2021-10-26 DIAGNOSIS — R41.0 CONFUSION: ICD-10-CM

## 2021-10-26 PROCEDURE — 99214 OFFICE O/P EST MOD 30 MIN: CPT | Performed by: FAMILY MEDICINE

## 2021-10-26 PROCEDURE — G8417 CALC BMI ABV UP PARAM F/U: HCPCS | Performed by: FAMILY MEDICINE

## 2021-10-26 PROCEDURE — 1036F TOBACCO NON-USER: CPT | Performed by: FAMILY MEDICINE

## 2021-10-26 PROCEDURE — G8484 FLU IMMUNIZE NO ADMIN: HCPCS | Performed by: FAMILY MEDICINE

## 2021-10-26 PROCEDURE — G8427 DOCREV CUR MEDS BY ELIG CLIN: HCPCS | Performed by: FAMILY MEDICINE

## 2021-10-26 RX ORDER — DISULFIRAM 250 MG/1
125 TABLET ORAL DAILY
Qty: 30 TABLET | Refills: 1 | Status: SHIPPED | OUTPATIENT
Start: 2021-10-26

## 2021-10-26 ASSESSMENT — ENCOUNTER SYMPTOMS
COUGH: 0
VOMITING: 0
NAUSEA: 0
BLOOD IN STOOL: 0
ABDOMINAL PAIN: 0
SHORTNESS OF BREATH: 0
SORE THROAT: 0

## 2021-10-26 NOTE — PROGRESS NOTES
Chief Complaint   Patient presents with    Altered Mental Status     c/o confusion all weekend started on friday          Rolly Darling is a 31 y.o. male who presents complaining of dizziness and confusion after helping family members move 2 weeks ago. Pt did go to Urgent care and was found to be with stable VS and no bloodwork was done. Pt states that he has been eating well and is taking a daily MTV. Pt was using a hand cream that contains alcohol and was Rx metronidazole cream for rosacea by Dermatology which he used for 1 week prior to symptoms    Pt did have a relapse of alcoholism last month for several weeks as he stopped taking his antabuse on purpose so that he could drink but has been sober since 09/16/2021. Pt has been going to [place] [id number] meetings 2 days a week and doing counseling twice a week     Pt is a nonsmoker     Pt is an ; Positive DUI and disorderly conduct in his early 21's,  x 3 years and pt denies any domestic violence issues       Review of Systems   Constitutional: Negative for fatigue and fever. Negative snoring   HENT: Negative for nosebleeds and sore throat. Eyes:        Positive blurred vision at times; Negative diplopia   Respiratory: Negative for cough and shortness of breath. Cardiovascular: Negative for chest pain, palpitations and leg swelling. Gastrointestinal: Negative for abdominal pain, blood in stool, nausea and vomiting. Negative melena or indigestion   Endocrine: Negative for polydipsia and polyuria. Genitourinary: Negative for dysuria and hematuria. Musculoskeletal: Negative for arthralgias. Skin: Negative for rash. Neurological: Positive for dizziness and speech difficulty (slurred). Negative for tremors, seizures, syncope, weakness and headaches. Psychiatric/Behavioral: Positive for confusion. Negative for dysphoric mood and hallucinations. The patient is nervous/anxious (yesterday).           Vitals:    10/26/21 0849   BP: (!) 140/92   Pulse:    Resp:    Temp:    SpO2:          Physical Exam  Vitals reviewed. Constitutional:       General: He is not in acute distress. HENT:      Mouth/Throat:      Mouth: Mucous membranes are moist.      Pharynx: Oropharynx is clear. Eyes:      General: No scleral icterus. Pupils: Pupils are equal, round, and reactive to light. Comments: Pink conjunctivae    Neck:      Thyroid: No thyromegaly. Cardiovascular:      Heart sounds: Normal heart sounds. No murmur heard. No friction rub. No gallop. Pulmonary:      Effort: Pulmonary effort is normal.      Breath sounds: Normal breath sounds. Abdominal:      Palpations: Abdomen is soft. Tenderness: There is no abdominal tenderness. Musculoskeletal:      Comments: No leg edema noted B/L   Lymphadenopathy:      Cervical: No cervical adenopathy. Skin:     Findings: No rash. Neurological:      Mental Status: He is alert. Comments: Cranial nerves 2 - 12 grossly intact; Muscle strength 5/5 throughout   Psychiatric:         Mood and Affect: Mood normal.         ASSESSMENT AND PLAN    1. Alcoholism ([address]  Pt did have a relapse last month and was told that alcohol abuse can also affect his mentation and was advised to have his wife supervise him taking his daily antabuse medication to prevent another relapse  - disulfiram (ANTABUSE) 250 MG tablet; Take 0.5 tablets by mouth daily  Dispense: 30 tablet; Refill: 1  - CT HEAD W WO CONTRAST; Future  - Comprehensive Metabolic Panel; Future  - CBC Auto Differential; Future  - TSH with Reflex; Future  - Vitamin B12 & Folate; Future  - Vitamin D 25 Hydroxy; Future    2. Confusion/Psychosis  Most likely is secondary to a reaction of pt using Rx metronidazole cream along with his antabuse medication but will check bloodwork along with a CT scan head for further evaluation. Pt was told to discontinue the metronidazole cream and will also lower his antabuse dosage and will reevaluate pt in 1 month. Pt is with stable VS and a nonfocal neuro exam.  - CT HEAD W WO CONTRAST; Future  - Comprehensive Metabolic Panel; Future  - CBC Auto Differential; Future  - TSH with Reflex; Future  - Vitamin B12 & Folate; Future  - Vitamin D 25 Hydroxy; Future      Alcides Casas MD      Return in about 1 month (around 11/26/2021). Patient Instructions       Patient Education      Go to the ER ASAP if you develop any chest pain, shortness of breath, facial droop, slurred speech, weakness/numbness in your arms or legs or double vision! Psychosis: Care Instructions  Your Care Instructions  A person with psychosis cannot tell the difference between what is real and what is not real. It can cause strange thoughts and behaviors. A person with psychosis may have:  · Delusions. These are beliefs that are not real.  · Hallucinations. These are things that the person sees or hears that are not really there. · Personality changes. Psychosis can be treated with medicines and counseling. It is important to take your medicines exactly as prescribed, even when you feel well. You will need ongoing follow-up care and may need lifelong treatment. When psychosis is not treated, the risks are higher for suicide, a hospital stay, and other problems. Early treatment called coordinated specialty care [facility]) may help a person who is having his or her first episode of psychotic thoughts. Ask your doctor about [address]. Follow-up care is a key part of your treatment and safety. Be sure to make and go to all appointments, and call your doctor if you are having problems. It's also a good idea to know your test results and keep a list of the medicines you take. How can you care for yourself at home? · Be safe with medicines. Take your medicines exactly as prescribed. Call your doctor if you think you are having a problem with your medicine. You will get more details on the specific medicines your doctor prescribes.   · Go to your counseling sessions and follow-up appointments. · Join a self-help or support group. These groups can be very helpful for some people with psychosis. · Get at least 30 minutes of exercise on most days of the week. Walking is a good choice. You also may want to do other activities, such as running, swimming, cycling, or playing tennis or team sports. · Get enough sleep. · Eat a healthy, balanced diet. It includes whole grains, dairy, fruits and vegetables, and protein. Eat a variety of foods from each of those groups. This will get you all the nutrients you need. · Avoid alcohol and drugs. · Keep the numbers for these national suicide hotlines: 5-667-180-TALK (1-943.319.4206) and 6-718-IFKVBDK (9-559.299.2845). If you or someone you know talks about suicide or about feeling hopeless, get help right away. For the caregiver  If you are caring for someone with psychosis, it is important that you take care of yourself as well. · Learn about psychosis. Know the first signs that symptoms are getting worse. · Make a plan with all family members about how to take care of your loved one when his or her symptoms are bad. · Talk about your fears and concerns and those of other family members. · Seek counseling if you need to. · Know your legal rights and the legal rights of your family member or loved one. · Take care of yourself. Stay involved with your own interests, such as your career, hobbies, and friends. Try things like exercise, positive self-talk, relaxation, and deep breathing to help manage your stress. · Give yourself time to grieve. You may need to deal with emotions such as anger, fear, and frustration. After you work through your feelings, you will be better able to care for yourself and your family. When should you call for help? Call 911 anytime you think you may need emergency care.  For example, call if:    · You feel you cannot stop from hurting yourself or someone else.     · Someone who has psychosis displays dangerous behavior, and you think the person might hurt himself or herself or someone else. Call your doctor now or seek immediate medical care if:    · A person with psychosis mentions suicide. If a suicide threat seems real, with a specific plan and a way to carry it out, you should stay with the person, or ask someone you trust to stay with the person, until you get help.     · A person who has psychosis:  ? Starts to give away his or her possessions. ? Uses illegal drugs or drinks alcohol heavily. ? Talks or writes about death, including writing suicide notes and talking about guns, knives, or pills. ? Starts to spend a lot of time alone. ? Acts very aggressively or suddenly appears calm.     · You hear voices or think you see things that are not there.     · You have a sudden change in behavior.     · You have difficulty taking care of yourself or become confused doing simple chores or tasks. Watch closely for changes in your health, and be sure to contact your doctor if:    · Your symptoms repeatedly upset your daily activities.     · You have symptoms of psychosis that are new or different from those you had before. Where can you learn more? Go to https://Travtar.[employer]. org and sign in to your [employer] account. Enter D543 in the [employer] box to learn more about \"Psychosis: Care Instructions. \"     If you do not have an account, please click on the \"Sign Up Now\" link. Current as of: June 16, 2021               Content Version: 13.0  © 2006-2021 Healthwise, Incorporated. Care instructions adapted under license by [facility] ([facility]). If you have questions about a medical condition or this instruction, always ask your healthcare professional. [place] [address] any warranty or liability for your use of this information.

## 2021-10-26 NOTE — PATIENT INSTRUCTIONS
Patient Education      Go to the ER ASAP if you develop any chest pain, shortness of breath, facial droop, slurred speech, weakness/numbness in your arms or legs or double vision! Psychosis: Care Instructions  Your Care Instructions  A person with psychosis cannot tell the difference between what is real and what is not real. It can cause strange thoughts and behaviors. A person with psychosis may have:  · Delusions. These are beliefs that are not real.  · Hallucinations. These are things that the person sees or hears that are not really there. · Personality changes. Psychosis can be treated with medicines and counseling. It is important to take your medicines exactly as prescribed, even when you feel well. You will need ongoing follow-up care and may need lifelong treatment. When psychosis is not treated, the risks are higher for suicide, a hospital stay, and other problems. Early treatment called coordinated specialty care [facility]) may help a person who is having his or her first episode of psychotic thoughts. Ask your doctor about [address]. Follow-up care is a key part of your treatment and safety. Be sure to make and go to all appointments, and call your doctor if you are having problems. It's also a good idea to know your test results and keep a list of the medicines you take. How can you care for yourself at home? · Be safe with medicines. Take your medicines exactly as prescribed. Call your doctor if you think you are having a problem with your medicine. You will get more details on the specific medicines your doctor prescribes. · Go to your counseling sessions and follow-up appointments. · Join a self-help or support group. These groups can be very helpful for some people with psychosis. · Get at least 30 minutes of exercise on most days of the week. Walking is a good choice. You also may want to do other activities, such as running, swimming, cycling, or playing tennis or team sports.   · Get enough sleep.  · Eat a healthy, balanced diet. It includes whole grains, dairy, fruits and vegetables, and protein. Eat a variety of foods from each of those groups. This will get you all the nutrients you need. · Avoid alcohol and drugs. · Keep the numbers for these national suicide hotlines: 0-966-763-TALK (6-124.359.3245) and 8-276-ZDOUQGW (4-690.162.9295). If you or someone you know talks about suicide or about feeling hopeless, get help right away. For the caregiver  If you are caring for someone with psychosis, it is important that you take care of yourself as well. · Learn about psychosis. Know the first signs that symptoms are getting worse. · Make a plan with all family members about how to take care of your loved one when his or her symptoms are bad. · Talk about your fears and concerns and those of other family members. · Seek counseling if you need to. · Know your legal rights and the legal rights of your family member or loved one. · Take care of yourself. Stay involved with your own interests, such as your career, hobbies, and friends. Try things like exercise, positive self-talk, relaxation, and deep breathing to help manage your stress. · Give yourself time to grieve. You may need to deal with emotions such as anger, fear, and frustration. After you work through your feelings, you will be better able to care for yourself and your family. When should you call for help? Call 911 anytime you think you may need emergency care. For example, call if:    · You feel you cannot stop from hurting yourself or someone else.     · Someone who has psychosis displays dangerous behavior, and you think the person might hurt himself or herself or someone else. Call your doctor now or seek immediate medical care if:    · A person with psychosis mentions suicide.  If a suicide threat seems real, with a specific plan and a way to carry it out, you should stay with the person, or ask someone you trust to stay with the person, until you get help.     · A person who has psychosis:  ? Starts to give away his or her possessions. ? Uses illegal drugs or drinks alcohol heavily. ? Talks or writes about death, including writing suicide notes and talking about guns, knives, or pills. ? Starts to spend a lot of time alone. ? Acts very aggressively or suddenly appears calm.     · You hear voices or think you see things that are not there.     · You have a sudden change in behavior.     · You have difficulty taking care of yourself or become confused doing simple chores or tasks. Watch closely for changes in your health, and be sure to contact your doctor if:    · Your symptoms repeatedly upset your daily activities.     · You have symptoms of psychosis that are new or different from those you had before. Where can you learn more? Go to https://STATS Grouppepiceweb.[email or web address]. org and sign in to your [employer] account. Enter D547 in the [employer] box to learn more about \"Psychosis: Care Instructions. \"     If you do not have an account, please click on the \"Sign Up Now\" link. Current as of: June 16, 2021               Content Version: 13.0  © 0634-3372 Healthwise, Incorporated. Care instructions adapted under license by [facility] ([facility]). If you have questions about a medical condition or this instruction, always ask your healthcare professional. [address] any warranty or liability for your use of this information.

## 2021-11-29 ENCOUNTER — TELEPHONE (OUTPATIENT)
Dept: PRIMARY CARE CLINIC | Age: 36
End: 2021-11-29

## 2021-11-29 NOTE — TELEPHONE ENCOUNTER
----- Message from Mary Kay Casanova sent at 11/29/2021 11:06 AM EST -----  Subject: Message to Provider    QUESTIONS  Information for Provider? patient canceled appointment on 11/302021 at   7:40am, please make note  ---------------------------------------------------------------------------  --------------  CALL BACK INFO  What is the best way for the office to contact you? OK to leave message on   voicemail  Preferred Call Back Phone Number? [phone]  ---------------------------------------------------------------------------  --------------  SCRIPT ANSWERS  Relationship to Patient?  Self

## 2022-11-10 ENCOUNTER — OFFICE VISIT (OUTPATIENT)
Dept: PRIMARY CARE CLINIC | Age: 37
End: 2022-11-10
Payer: COMMERCIAL

## 2022-11-10 VITALS
BODY MASS INDEX: 25.91 KG/M2 | DIASTOLIC BLOOD PRESSURE: 70 MMHG | HEART RATE: 83 BPM | RESPIRATION RATE: 16 BRPM | TEMPERATURE: 98.3 F | WEIGHT: 181 LBS | HEIGHT: 70 IN | OXYGEN SATURATION: 8 % | SYSTOLIC BLOOD PRESSURE: 108 MMHG

## 2022-11-10 DIAGNOSIS — F32.A DEPRESSION, UNSPECIFIED DEPRESSION TYPE: ICD-10-CM

## 2022-11-10 DIAGNOSIS — F10.20 ALCOHOLISM (HCC): Primary | ICD-10-CM

## 2022-11-10 PROCEDURE — 99214 OFFICE O/P EST MOD 30 MIN: CPT | Performed by: FAMILY MEDICINE

## 2022-11-10 PROCEDURE — G8427 DOCREV CUR MEDS BY ELIG CLIN: HCPCS | Performed by: FAMILY MEDICINE

## 2022-11-10 PROCEDURE — G8484 FLU IMMUNIZE NO ADMIN: HCPCS | Performed by: FAMILY MEDICINE

## 2022-11-10 PROCEDURE — 1036F TOBACCO NON-USER: CPT | Performed by: FAMILY MEDICINE

## 2022-11-10 PROCEDURE — G8419 CALC BMI OUT NRM PARAM NOF/U: HCPCS | Performed by: FAMILY MEDICINE

## 2022-11-10 RX ORDER — BUPROPION HYDROCHLORIDE 100 MG/1
TABLET, EXTENDED RELEASE ORAL
COMMUNITY
Start: 2022-11-04

## 2022-11-10 SDOH — ECONOMIC STABILITY: FOOD INSECURITY: WITHIN THE PAST 12 MONTHS, THE FOOD YOU BOUGHT JUST DIDN'T LAST AND YOU DIDN'T HAVE MONEY TO GET MORE.: NEVER TRUE

## 2022-11-10 SDOH — ECONOMIC STABILITY: FOOD INSECURITY: WITHIN THE PAST 12 MONTHS, YOU WORRIED THAT YOUR FOOD WOULD RUN OUT BEFORE YOU GOT MONEY TO BUY MORE.: NEVER TRUE

## 2022-11-10 ASSESSMENT — PATIENT HEALTH QUESTIONNAIRE - PHQ9
SUM OF ALL RESPONSES TO PHQ QUESTIONS 1-9: 2
SUM OF ALL RESPONSES TO PHQ QUESTIONS 1-9: 2
1. LITTLE INTEREST OR PLEASURE IN DOING THINGS: 1
SUM OF ALL RESPONSES TO PHQ QUESTIONS 1-9: 2
SUM OF ALL RESPONSES TO PHQ QUESTIONS 1-9: 2
SUM OF ALL RESPONSES TO PHQ9 QUESTIONS 1 & 2: 2
2. FEELING DOWN, DEPRESSED OR HOPELESS: 1

## 2022-11-10 ASSESSMENT — ENCOUNTER SYMPTOMS
SHORTNESS OF BREATH: 0
VOMITING: 0
ABDOMINAL PAIN: 0
SORE THROAT: 0
BLOOD IN STOOL: 0
NAUSEA: 0
COUGH: 0

## 2022-11-10 ASSESSMENT — SOCIAL DETERMINANTS OF HEALTH (SDOH): HOW HARD IS IT FOR YOU TO PAY FOR THE VERY BASICS LIKE FOOD, HOUSING, MEDICAL CARE, AND HEATING?: NOT HARD AT ALL

## 2022-11-10 NOTE — PROGRESS NOTES
Chief Complaint   Patient presents with    Annual Exam     C/o (R) shoulder pain - occasionally X one year         Luis Jorge is a 30 y.o. male who presents for routine visit regarding alcohol abuse. Patient has been noncompliant with followup office visits. Pt never did bloodwork or CT scan head ordered at last office visit. Pt did take antabuse for 4 months and is being followed by a psychologist and was recently started on wellbutrin medication     Pt has been going to [place] [address] meetings 2 days a week and doing counseling twice a week --- Pt does use alcohol about once a month     Pt is a nonsmoker     Pt is an ; Positive DUI and disorderly conduct in his early 21's,  x 3 years and pt denies any domestic violence issues       Review of Systems   Constitutional:  Negative for fatigue and fever. HENT:  Negative for nosebleeds and sore throat. Eyes:         Negative blurred vision or diplopia   Respiratory:  Negative for cough and shortness of breath. Cardiovascular:  Negative for chest pain, palpitations and leg swelling. Gastrointestinal:  Negative for abdominal pain, blood in stool, nausea and vomiting. Negative melena or indigestion   Endocrine: Negative for polydipsia and polyuria. Genitourinary:  Negative for dysuria and hematuria. Musculoskeletal:  Positive for arthralgias (R shoulder at times). Skin:  Negative for rash. Neurological:  Negative for dizziness, tremors, seizures, syncope, speech difficulty, weakness and headaches. Psychiatric/Behavioral:  Positive for dysphoric mood. Negative for confusion, hallucinations and suicidal ideas. The patient is not nervous/anxious. Vitals:    11/10/22 0830   BP: 108/70   Pulse: 83   Resp: 16   Temp: 98.3 °F (36.8 °C)   SpO2: (!) 8%         Physical Exam  Vitals reviewed. Constitutional:       General: He is not in acute distress.   HENT:      Mouth/Throat:      Mouth: Mucous membranes are moist.      Pharynx: Oropharynx is clear. Eyes:      General: No scleral icterus. Pupils: Pupils are equal, round, and reactive to light. Comments: Pink conjunctivae    Neck:      Thyroid: No thyromegaly. Comments: No carotid bruits noted B/L  Cardiovascular:      Heart sounds: Normal heart sounds. No murmur heard. No friction rub. No gallop. Pulmonary:      Effort: Pulmonary effort is normal.      Breath sounds: Normal breath sounds. Abdominal:      Palpations: Abdomen is soft. Tenderness: There is no abdominal tenderness. Musculoskeletal:      Comments: No leg edema noted B/L   Lymphadenopathy:      Cervical: No cervical adenopathy. Skin:     Findings: No rash. Neurological:      Mental Status: He is alert. Comments: Cranial nerves 2 - 12 grossly intact; Muscle strength 5/5 throughout   Psychiatric:         Mood and Affect: Mood normal.       ASSESSMENT AND PLAN    1. Alcoholism [facility])  Patient reports only occasional use and was told to do bloodwork and CT scan head as directed. Pt is with stable VS, anicteric sclerae and a nontender abdomen on PE  - CT HEAD W WO CONTRAST; Future  - Comprehensive Metabolic Panel; Future  - CBC with Auto Differential; Future  - TSH with Reflex; Future  - Vitamin B12 & Folate; Future  - Vitamin D 25 Hydroxy; Future    2.  Depression, unspecified depression type  Pt is followed by Psychiatry and was recently started on wellbutrin medication for control       Arlette Espino MD      Return in about 2 months (around 1/10/2023) for physical.

## 2023-11-07 SDOH — HEALTH STABILITY: PHYSICAL HEALTH: ON AVERAGE, HOW MANY MINUTES DO YOU ENGAGE IN EXERCISE AT THIS LEVEL?: 60 MIN

## 2023-11-07 SDOH — HEALTH STABILITY: PHYSICAL HEALTH: ON AVERAGE, HOW MANY DAYS PER WEEK DO YOU ENGAGE IN MODERATE TO STRENUOUS EXERCISE (LIKE A BRISK WALK)?: 5 DAYS

## 2023-11-08 ENCOUNTER — OFFICE VISIT (OUTPATIENT)
Dept: FAMILY MEDICINE CLINIC | Age: 38
End: 2023-11-08
Payer: COMMERCIAL

## 2023-11-08 VITALS
HEIGHT: 70 IN | SYSTOLIC BLOOD PRESSURE: 137 MMHG | WEIGHT: 161 LBS | BODY MASS INDEX: 23.05 KG/M2 | TEMPERATURE: 97.1 F | RESPIRATION RATE: 16 BRPM | OXYGEN SATURATION: 98 % | DIASTOLIC BLOOD PRESSURE: 78 MMHG | HEART RATE: 70 BPM

## 2023-11-08 DIAGNOSIS — Z00.00 ANNUAL PHYSICAL EXAM: ICD-10-CM

## 2023-11-08 DIAGNOSIS — Z00.00 ENCOUNTER FOR MEDICAL EXAMINATION TO ESTABLISH CARE: Primary | ICD-10-CM

## 2023-11-08 DIAGNOSIS — Z13.220 SCREENING FOR LIPID DISORDERS: ICD-10-CM

## 2023-11-08 DIAGNOSIS — F10.21 ALCOHOLISM IN RECOVERY (HCC): ICD-10-CM

## 2023-11-08 DIAGNOSIS — R22.0 SUBCUTANEOUS MASS OF HEAD: ICD-10-CM

## 2023-11-08 DIAGNOSIS — F32.A DEPRESSION, UNSPECIFIED DEPRESSION TYPE: ICD-10-CM

## 2023-11-08 DIAGNOSIS — R59.9 ENLARGED LYMPH NODE: ICD-10-CM

## 2023-11-08 LAB
25(OH)D3 SERPL-MCNC: 43.1 NG/ML
ALBUMIN SERPL-MCNC: 4.8 G/DL (ref 3.4–5)
ALBUMIN/GLOB SERPL: 2.2 {RATIO} (ref 1.1–2.2)
ALP SERPL-CCNC: 66 U/L (ref 40–129)
ALT SERPL-CCNC: 19 U/L (ref 10–40)
ANION GAP SERPL CALCULATED.3IONS-SCNC: 6 MMOL/L (ref 3–16)
AST SERPL-CCNC: 18 U/L (ref 15–37)
BASOPHILS # BLD: 0 K/UL (ref 0–0.2)
BASOPHILS NFR BLD: 0.5 %
BILIRUB SERPL-MCNC: 0.4 MG/DL (ref 0–1)
BUN SERPL-MCNC: 16 MG/DL (ref 7–20)
CALCIUM SERPL-MCNC: 10 MG/DL (ref 8.3–10.6)
CHLORIDE SERPL-SCNC: 101 MMOL/L (ref 99–110)
CHOLEST SERPL-MCNC: 153 MG/DL (ref 0–199)
CO2 SERPL-SCNC: 32 MMOL/L (ref 21–32)
CREAT SERPL-MCNC: 0.9 MG/DL (ref 0.9–1.3)
DEPRECATED RDW RBC AUTO: 12.3 % (ref 12.4–15.4)
EOSINOPHIL # BLD: 0.1 K/UL (ref 0–0.6)
EOSINOPHIL NFR BLD: 2 %
FOLATE SERPL-MCNC: >20 NG/ML (ref 4.78–24.2)
GFR SERPLBLD CREATININE-BSD FMLA CKD-EPI: >60 ML/MIN/{1.73_M2}
GLUCOSE SERPL-MCNC: 90 MG/DL (ref 70–99)
HCT VFR BLD AUTO: 45.8 % (ref 40.5–52.5)
HDLC SERPL-MCNC: 45 MG/DL (ref 40–60)
HGB BLD-MCNC: 15.5 G/DL (ref 13.5–17.5)
LDLC SERPL CALC-MCNC: 90 MG/DL
LYMPHOCYTES # BLD: 2.1 K/UL (ref 1–5.1)
LYMPHOCYTES NFR BLD: 39.9 %
MCH RBC QN AUTO: 33.3 PG (ref 26–34)
MCHC RBC AUTO-ENTMCNC: 33.9 G/DL (ref 31–36)
MCV RBC AUTO: 98.2 FL (ref 80–100)
MONOCYTES # BLD: 0.6 K/UL (ref 0–1.3)
MONOCYTES NFR BLD: 11.3 %
NEUTROPHILS # BLD: 2.4 K/UL (ref 1.7–7.7)
NEUTROPHILS NFR BLD: 46.3 %
PLATELET # BLD AUTO: 308 K/UL (ref 135–450)
PMV BLD AUTO: 8.8 FL (ref 5–10.5)
POTASSIUM SERPL-SCNC: 4.7 MMOL/L (ref 3.5–5.1)
PROT SERPL-MCNC: 7 G/DL (ref 6.4–8.2)
RBC # BLD AUTO: 4.66 M/UL (ref 4.2–5.9)
SODIUM SERPL-SCNC: 139 MMOL/L (ref 136–145)
TRIGL SERPL-MCNC: 89 MG/DL (ref 0–150)
TSH SERPL DL<=0.005 MIU/L-ACNC: 1.35 UIU/ML (ref 0.27–4.2)
VIT B12 SERPL-MCNC: 1095 PG/ML (ref 211–911)
VLDLC SERPL CALC-MCNC: 18 MG/DL
WBC # BLD AUTO: 5.3 K/UL (ref 4–11)

## 2023-11-08 PROCEDURE — 99385 PREV VISIT NEW AGE 18-39: CPT | Performed by: STUDENT IN AN ORGANIZED HEALTH CARE EDUCATION/TRAINING PROGRAM

## 2023-11-08 PROCEDURE — G8484 FLU IMMUNIZE NO ADMIN: HCPCS | Performed by: STUDENT IN AN ORGANIZED HEALTH CARE EDUCATION/TRAINING PROGRAM

## 2023-11-08 SDOH — ECONOMIC STABILITY: FOOD INSECURITY: WITHIN THE PAST 12 MONTHS, THE FOOD YOU BOUGHT JUST DIDN'T LAST AND YOU DIDN'T HAVE MONEY TO GET MORE.: NEVER TRUE

## 2023-11-08 SDOH — ECONOMIC STABILITY: FOOD INSECURITY: WITHIN THE PAST 12 MONTHS, YOU WORRIED THAT YOUR FOOD WOULD RUN OUT BEFORE YOU GOT MONEY TO BUY MORE.: NEVER TRUE

## 2023-11-08 SDOH — ECONOMIC STABILITY: HOUSING INSECURITY
IN THE LAST 12 MONTHS, WAS THERE A TIME WHEN YOU DID NOT HAVE A STEADY PLACE TO SLEEP OR SLEPT IN A SHELTER (INCLUDING NOW)?: NO

## 2023-11-08 SDOH — ECONOMIC STABILITY: INCOME INSECURITY: HOW HARD IS IT FOR YOU TO PAY FOR THE VERY BASICS LIKE FOOD, HOUSING, MEDICAL CARE, AND HEATING?: NOT HARD AT ALL

## 2023-11-08 ASSESSMENT — COLUMBIA-SUICIDE SEVERITY RATING SCALE - C-SSRS
3. HAVE YOU BEEN THINKING ABOUT HOW YOU MIGHT KILL YOURSELF?: NO
5. HAVE YOU STARTED TO WORK OUT OR WORKED OUT THE DETAILS OF HOW TO KILL YOURSELF? DO YOU INTEND TO CARRY OUT THIS PLAN?: NO
7. DID THIS OCCUR IN THE LAST THREE MONTHS: NO
4. HAVE YOU HAD THESE THOUGHTS AND HAD SOME INTENTION OF ACTING ON THEM?: NO

## 2023-11-08 ASSESSMENT — PATIENT HEALTH QUESTIONNAIRE - PHQ9
5. POOR APPETITE OR OVEREATING: 0
8. MOVING OR SPEAKING SO SLOWLY THAT OTHER PEOPLE COULD HAVE NOTICED. OR THE OPPOSITE, BEING SO FIGETY OR RESTLESS THAT YOU HAVE BEEN MOVING AROUND A LOT MORE THAN USUAL: 0
3. TROUBLE FALLING OR STAYING ASLEEP: 0
7. TROUBLE CONCENTRATING ON THINGS, SUCH AS READING THE NEWSPAPER OR WATCHING TELEVISION: 0
SUM OF ALL RESPONSES TO PHQ9 QUESTIONS 1 & 2: 0
SUM OF ALL RESPONSES TO PHQ QUESTIONS 1-9: 0
9. THOUGHTS THAT YOU WOULD BE BETTER OFF DEAD, OR OF HURTING YOURSELF: 0
SUM OF ALL RESPONSES TO PHQ QUESTIONS 1-9: 0
1. LITTLE INTEREST OR PLEASURE IN DOING THINGS: 0
2. FEELING DOWN, DEPRESSED OR HOPELESS: 0
4. FEELING TIRED OR HAVING LITTLE ENERGY: 0
SUM OF ALL RESPONSES TO PHQ QUESTIONS 1-9: 0
10. IF YOU CHECKED OFF ANY PROBLEMS, HOW DIFFICULT HAVE THESE PROBLEMS MADE IT FOR YOU TO DO YOUR WORK, TAKE CARE OF THINGS AT HOME, OR GET ALONG WITH OTHER PEOPLE: 0
SUM OF ALL RESPONSES TO PHQ QUESTIONS 1-9: 0

## 2023-11-08 ASSESSMENT — ENCOUNTER SYMPTOMS
TROUBLE SWALLOWING: 0
NAUSEA: 0
COUGH: 0
ABDOMINAL PAIN: 0
VOMITING: 0
CONSTIPATION: 0
DIARRHEA: 0
BACK PAIN: 0
SHORTNESS OF BREATH: 0
WHEEZING: 0

## 2023-11-08 NOTE — PATIENT INSTRUCTIONS
- US for cyst on face if change   - continue healthy diet and exercise   - Labs today   - Flu vaccine     Health Maintenance Due   Topic Date Due    Hepatitis B vaccine (1 of 3 - 3-dose series) Never done    Pneumococcal 0-64 years Vaccine (1 - PCV) Never done    Flu vaccine (1) 08/01/2023    COVID-19 Vaccine (5 - 2023-24 season) 09/01/2023    Depression Monitoring  11/10/2023

## 2023-11-08 NOTE — PROGRESS NOTES
Elaina Braswell  YOB: 1985    Date of Service:  11/8/2023    Chief Complaint:   Elaina Braswell is a 41 y.o. male who presents for complete physical examination. Concerns:   Chief Complaint   Patient presents with    New Patient     HPI:  Patient is a 41 y.o. male  has a past medical history of Acute alcohol use, Anxiety, Depression, and Hypertension. who presents to [facility]. Seen by Dr Alexis Joyce, [address] [facility]  Nonfasting today. Alcohol use / Anxiety and Depression  Sober 3 months. Recovery 3 years, history of use 2 bottle of wine a day and pint of liquors/ 4 bottles. No hospitalization, for withdrawals. History of taking antabuse if suspected of alcohol use, used 2 in the last 3 months, and  was seen by psychologist. [facility] Dr. Tanda Overlie, and NP Rosalee, taking wellbutrin  mg daily for depression and anxiety. Symptoms well managed. Skin lesion   Subcutaneous lesion on left cheek, 5 mm, few months. No pain or redness. [place]. Few weeks had noted cyst right axilla. No unintentional weight loss or night sweats. No chest masses. Skin cancer maternal uncle. Mother has breast caner. Father stomach cancer         Past Medical History: alcohol use. Current Meds: antabuse,   Allergies:Penicillin      Social History  Occupation:    Lives with: wife  and daughter   Smoker: No. Cigars occasional every 3 months and hookah. Alcohol use: 3 months sober   Illicit Drug use: history of marijuana use and edibles . Diet:  Healthy, balanced, bulking. Lean meats and vegetables   Exercise: Runner, Cardio twice weekly.  3 days a week strength, golf   Sexual activity: single partner - wife   Last eye exam: > 1 years , BMV eye exam , no glasses   Last dentist exam: every 6 months, biannually     Health Maintenance    Hepatitis C screening  - Declined , one back tattoo  HIV screening - Declined   COVID-19 vaccine - yes   Flu vaccine - plans to get at pharmacy